# Patient Record
(demographics unavailable — no encounter records)

---

## 2025-04-10 NOTE — PLAN
[FreeTextEntry1] : office ua shows positive nitrites and blood. urine sent out for culture. Plan of care is to await culture results and sensitivity and continue present antibiotics or switch. also recommended for long term care antibiotics for prophylaxis

## 2025-04-10 NOTE — HISTORY OF PRESENT ILLNESS
[FreeTextEntry1] : Patient in office for UTI symptoms. Patient brought in by her daughter in a wheelchair with a urine sample to be tested for uti as the patient has constant uti's. . Patient currently on nitrofurantoin prescribed by another physician and is on second day of therapy.